# Patient Record
(demographics unavailable — no encounter records)

---

## 2025-04-23 NOTE — DATA REVIEWED
[FreeTextEntry1] : Independent review of imaging and independent interpretation was performed at today's visit. -4/24/23 CT Chest non contrast through [facility]

## 2025-04-23 NOTE — HISTORY OF PRESENT ILLNESS
[FreeTextEntry1] : Ms. STEPHANIE HELLER is a 61-year-old female who presents today for follow up. Previously, she has been followed for a right lung nodule. At our last visit in May 2022, it was recommended she obtain CT surveillance imaging in 6 months. However, she was lost to follow up.   Additional past medical history includes CVA, PFO, current smoker  4/28/22 CT Chest non contrast through [facility] -Emphysema and stable small nodules  4/24/23 CT Chest non contrast through [facility] -Again, noted is emphysema, bronchial thickening and scattered nodules measuring up to 4mm, without new or enlarging nodule

## 2025-04-23 NOTE — ASSESSMENT
[FreeTextEntry1] : Ms. STEPHANIE HELLER is a 61-year-old female who presents today for follow up. Previously, she has been followed for a right lung nodule. At our last visit in May 2022, it was recommended she obtain CT surveillance imaging in 6 months. However, she was lost to follow up.   Additional past medical history includes CVA, PFO, current smoker  4/24/23 CT Chest non contrast through [facility] -Again, noted is emphysema, bronchial thickening and scattered nodules measuring up to 4mm, without new or enlarging nodule  I have reviewed the patient's medical records and diagnostic images at time of this consultation and have made the following recommendations: 1. 2.  All questions answered, patient verbalizes understanding and agrees to follow up accordingly.

## 2025-04-24 NOTE — DATA REVIEWED
[FreeTextEntry1] : Independent review of imaging and independent interpretation was performed at today's visit. -4/24/23 CT Chest non contrast through [facility]

## 2025-04-24 NOTE — ASSESSMENT
[FreeTextEntry1] : Ms. STEPHANIE HELLER is a 61-year-old female who presents today for follow up. Previously, she has been followed for a right lung nodule. At our last visit in May 2022, it was recommended she obtain CT surveillance imaging in 6 months. However, she was lost to follow up.   Additional past medical history includes CVA, PFO, current smoker  4/24/23 CT Chest non contrast through [facility] -Again, noted is emphysema, bronchial thickening and scattered nodules measuring up to 4mm, without new or enlarging nodule  I have reviewed the patient's medical records and diagnostic images at time of this consultation and have made the following recommendations: 1. 2.  All questions answered, patient verbalizes understanding and agrees to follow up accordingly.

## 2025-04-24 NOTE — HISTORY OF PRESENT ILLNESS
[FreeTextEntry1] : Ms. STEPHANIE HELLER is a 61-year-old female who presents today for follow up. Previously, she has been followed for a right lung nodule. At our last visit in May 2022, it was recommended she obtain CT surveillance imaging in 6 months. However, she was lost to follow up.   Additional past medical history includes CVA, PFO, current smoker  4/28/22 CT Chest non contrast through [facility] -Emphysema and stable small nodules  4/24/23 CT Chest non contrast through [facility] -Again, noted is emphysema, bronchial thickening and scattered nodules measuring up to 4mm, without new or enlarging nodule

## 2025-05-15 NOTE — ASSESSMENT
[FreeTextEntry1] : Ms. STEPHANIE HELLER is a 61-year-old female who presents today for follow up. Previously, she has been followed for a right lung nodule. At our last visit in May 2022, it was recommended she obtain CT surveillance imaging in 6 months. However, she was lost to follow up.   Additional past medical history includes CVA, PFO s/p repair (2013) current smoker  4/24/23 CT Chest non contrast through [facility] -Again, noted is emphysema, bronchial thickening and scattered nodules measuring up to 4mm, without new or enlarging nodule  Upon review of the CT imaging, there are scattered nodules measuring up to 4mm, however this scan is dated 2023. I am recommending she obtain more recent CT imaging for further evaluation of these nodules. All questions answered, patient verbalizes understanding and agrees to follow up accordingly.  I have reviewed the patient's medical records and diagnostic images at time of this consultation and have made the following recommendations: 1. CT Chest non contrast now 2. Return to care in 1 month or sooner if needed  I, Dr. Edmond personally performed the evaluation and management (E/M) services for this new patient.  That E/M includes conducting the initial examination, assessing all conditions, and establishing the plan of care.  Today, my ACP, Agnieszka Mccormack NP was here to observe my evaluation and management services for this patient to be followed going forward.

## 2025-05-15 NOTE — PHYSICAL EXAM
[General Appearance - Alert] : alert [Sclera] : the sclera and conjunctiva were normal [Outer Ear] : the ears and nose were normal in appearance [Neck Appearance] : the appearance of the neck was normal [Respiration, Rhythm And Depth] : normal respiratory rhythm and effort [Auscultation Breath Sounds / Voice Sounds] : lungs were clear to auscultation bilaterally [Heart Rate And Rhythm] : heart rate was normal and rhythm regular [Heart Sounds] : normal S1 and S2 [Examination Of The Chest] : the chest was normal in appearance [Abnormal Walk] : normal gait [Skin Color & Pigmentation] : normal skin color and pigmentation [No Focal Deficits] : no focal deficits [Oriented To Time, Place, And Person] : oriented to person, place, and time [Impaired Insight] : insight and judgment were intact [Affect] : the affect was normal [Mood] : the mood was normal

## 2025-05-15 NOTE — HISTORY OF PRESENT ILLNESS
[FreeTextEntry1] : Ms. STEPHANIE HLELER is a 61-year-old female who presents today for follow up. Previously, she has been followed for a right lung nodule. At our last visit in May 2022, it was recommended she obtain CT surveillance imaging in 6 months. However, she was lost to follow up.   Additional past medical history includes CVA, PFO s/p repair (2013) current smoker  4/28/22 CT Chest non contrast through [facility] -Emphysema and stable small nodules  4/24/23 CT Chest non contrast through [facility] -Again, noted is emphysema, bronchial thickening and scattered nodules measuring up to 4mm, without new or enlarging nodule  Today, she feels overall well, offers no complaints. She is undergoing a stress test for cardiac clearance for hand surgery.  Patient denies chest pain, palpitations, shortness of breath, cough, fevers, chills, fatigue, unintentional weight loss or gain, and night sweats.

## 2025-05-15 NOTE — DATA REVIEWED
[FreeTextEntry1] : Independent review of imaging and independent interpretation was performed at today's visit. -4/24/23 CT Chest non contrast through [facility]

## 2025-05-15 NOTE — PHYSICAL EXAM
[General Appearance - Alert] : alert [Sclera] : the sclera and conjunctiva were normal [Outer Ear] : the ears and nose were normal in appearance [Neck Appearance] : the appearance of the neck was normal [Respiration, Rhythm And Depth] : normal respiratory rhythm and effort [Auscultation Breath Sounds / Voice Sounds] : lungs were clear to auscultation bilaterally Detail Level: Generalized [Heart Rate And Rhythm] : heart rate was normal and rhythm regular [Heart Sounds] : normal S1 and S2 [Examination Of The Chest] : the chest was normal in appearance [Abnormal Walk] : normal gait [Skin Color & Pigmentation] : normal skin color and pigmentation Detail Level: Zone [No Focal Deficits] : no focal deficits [Oriented To Time, Place, And Person] : oriented to person, place, and time [Impaired Insight] : insight and judgment were intact [Affect] : the affect was normal [Mood] : the mood was normal

## 2025-05-15 NOTE — DATA REVIEWED
[FreeTextEntry1] : Independent review of imaging and independent interpretation was performed at today's visit. -4/24/23 CT Chest non contrast through [facility]

## 2025-05-15 NOTE — REVIEW OF SYSTEMS
[Negative] : Heme/Lymph [Fever] : no fever [Chills] : no chills [Feeling Poorly] : not feeling poorly [Feeling Tired] : not feeling tired [Chest Pain] : no chest pain [Shortness Of Breath] : no shortness of breath [SOB on Exertion] : no shortness of breath during exertion

## 2025-05-15 NOTE — ASSESSMENT
[FreeTextEntry1] : Ms. STEPHANIE HELLER is a 61-year-old female who presents today for follow up. Previously, she has been followed for a right lung nodule. At our last visit in May 2022, it was recommended she obtain CT surveillance imaging in 6 months. However, she was lost to follow up.   Additional past medical history includes CVA, PFO s/p repair (2013) current smoker  4/24/23 CT Chest non contrast through [facility] -Again, noted is emphysema, bronchial thickening and scattered nodules measuring up to 4mm, without new or enlarging nodule  Upon review of the CT imaging, there are scattered nodules measuring up to 4mm, however this scan is dated 2023. I am recommending she obtain more recent CT imaging for further evaluation of these nodules. All questions answered, patient verbalizes understanding and agrees to follow up accordingly.  I have reviewed the patient's medical records and diagnostic images at time of this consultation and have made the following recommendations: 1. CT Chest non contrast now 2. Return to care in 1 month or sooner if needed  I, Dr. Edmond personally performed the evaluation and management (E/M) services for this new patient.  That E/M includes conducting the initial examination, assessing all conditions, and establishing the plan of care.  Today, my ACP, Agnieszka Mccormack NP was here to observe my evaluation and management services for this patient to be followed going forward.

## 2025-05-15 NOTE — HISTORY OF PRESENT ILLNESS
[FreeTextEntry1] : Ms. STEPHANIE HELLER is a 61-year-old female who presents today for follow up. Previously, she has been followed for a right lung nodule. At our last visit in May 2022, it was recommended she obtain CT surveillance imaging in 6 months. However, she was lost to follow up.   Additional past medical history includes CVA, PFO s/p repair (2013) current smoker  4/28/22 CT Chest non contrast through [facility] -Emphysema and stable small nodules  4/24/23 CT Chest non contrast through [facility] -Again, noted is emphysema, bronchial thickening and scattered nodules measuring up to 4mm, without new or enlarging nodule  Today, she feels overall well, offers no complaints. She is undergoing a stress test for cardiac clearance for hand surgery.  Patient denies chest pain, palpitations, shortness of breath, cough, fevers, chills, fatigue, unintentional weight loss or gain, and night sweats.

## 2025-07-02 NOTE — HISTORY OF PRESENT ILLNESS
[FreeTextEntry1] : Mrs. Harris is a 61-year-old female who presents today for a follow up appointment. She has been followed since approximately 2017 for lung nodules. At our last visit on 5/15/25, it was recommended she obtain updated CT Chest imaging to evaluate lung nodules as most recent imaging was from 2023. She returns today to review CT imaging results.   Her past medical history is significant for current smoker, emphysema, CVA, and patent PFO s/p repair in 2013.  The patient had previously been lost to follow-up. (Last seen in 2022)  4/28/22 CT Chest non contrast at [facility] -Emphysema and stable small lung nodules   4/24//2023 CT Chest non contrast at [facility] -Again noted is emphysema, bronchial thickening and scattered nodules measuring up to 4 mm, without new or enlarging nodules.   5/23/25 CT Chest non contrast at [facility] -Stable lung findings. No suspicious new lesion.

## 2025-07-02 NOTE — HISTORY OF PRESENT ILLNESS
[FreeTextEntry1] : Mrs. Harris is a 61-year-old female who presents today for a follow up appointment. She has been followed since approximately 2017 for lung nodules. At our last visit on 5/15/25, it was recommended she obtain updated CT Chest imaging to evaluate lung nodules as most recent imaging was from 2023. She returns today to review CT imaging results.   Her past medical history is significant for current smoker, emphysema, CVA, and patent PFO s/p repair in 2013.  The patient had previously been lost to follow-up. (Last seen in 2022)  4/28/22 CT Chest non contrast at [facility] -Emphysema and stable small lung nodules   4/24//2023 CT Chest non contrast at [facility] -Again noted is emphysema, bronchial thickening and scattered nodules measuring up to 4 mm, without new or enlarging nodules.   5/23/25 CT Chest non contrast at [facility] -Stable lung findings. No suspicious new lesion.

## 2025-07-02 NOTE — ASSESSMENT
[FreeTextEntry1] : Mrs. Harris is a 61-year-old female who presents today for a follow up appointment. She has been followed since approximately 2017 for lung nodules. At our last visit on 5/15/25, it was recommended she obtain updated CT Chest imaging to evaluate lung nodules as most recent imaging was from 2023. She returns today to review CT imaging results.   Her past medical history is significant for current smoker, emphysema, CVA, and patent PFO s/p repair in 2013.  5/23/25 CT Chest non contrast at [facility] -Stable lung findings. No suspicious new lesion.   I have reviewed the patient's medical records and diagnostic images at time of this consultation and have made the following recommendations: 1. 2.  All questions answered, patient verbalizes understanding and agrees to follow up accordingly.

## 2025-07-02 NOTE — ASSESSMENT
[FreeTextEntry1] : Mrs. Harris is a 61-year-old female who presents today for a follow up appointment. She has been followed since approximately 2017 for lung nodules. At our last visit on 5/15/25, it was recommended she obtain updated CT Chest imaging to evaluate lung nodules as most recent imaging was from 2023. She returns today to review CT imaging results.   Her past medical history is significant for current smoker, emphysema, CVA, and patent PFO s/p repair in 2013.  5/23/25 CT Chest non contrast at [facility] -Stable lung findings. No suspicious new lesion.   I have reviewed the patient's medical records and diagnostic images at time of this consultation and have made the following recommendations: 1. 2.  All questions answered, patient verbalizes understanding and agrees to follow up accordingly.

## 2025-07-02 NOTE — DATA REVIEWED
[FreeTextEntry1] : Independent review of imaging and independent interpretation was performed at today's visit. -5/23/25 CT Chest non contrast at [facility] 
[FreeTextEntry1] : Independent review of imaging and independent interpretation was performed at today's visit. -5/23/25 CT Chest non contrast at [facility] 
General Sunscreen Counseling: I recommended a broad spectrum sunscreen with a SPF of 30 or higher. I explained that SPF 30 sunscreens block approximately 97 percent of the sun's harmful rays. Sunscreens should be applied at least 15 minutes prior to expected sun exposure and then every 2 hours after that as long as sun exposure continues. If swimming or exercising sunscreen should be reapplied every 45 minutes to an hour after getting wet or sweating. One ounce, or the equivalent of a shot glass full of sunscreen, is adequate to protect the skin not covered by a bathing suit. I also recommended a lip balm with a sunscreen as well. Sun protective clothing can be used in lieu of sunscreen but must be worn the entire time you are exposed to the sun's rays.
Detail Level: Detailed

## 2025-07-03 NOTE — HISTORY OF PRESENT ILLNESS
[FreeTextEntry1] : Mrs. Harris is a 61-year-old female who presents today for a follow up appointment. She has been followed since approximately 2017 for lung nodules. At our last visit on 5/15/25, it was recommended she obtain updated CT Chest imaging to evaluate lung nodules as most recent imaging was from 2023. She returns today to review CT imaging results.   Her past medical history is significant for current smoker, emphysema, CVA, and patent PFO s/p repair in 2013.  The patient had previously been lost to follow-up. (Last seen in 2022)  4/28/22 CT Chest non contrast at [facility] -Emphysema and stable small lung nodules   4/24//2023 CT Chest non contrast at [facility] -Again noted is emphysema, bronchial thickening and scattered nodules measuring up to 4 mm, without new or enlarging nodules.   5/23/25 CT Chest non contrast at [facility] -Stable lung findings. No suspicious new lesion.

## 2025-07-03 NOTE — DATA REVIEWED
[FreeTextEntry1] : Independent review of imaging and independent interpretation was performed at today's visit. -5/23/25 CT Chest non contrast at [facility]

## 2025-07-03 NOTE — ASSESSMENT
[FreeTextEntry1] : Mrs. Harris is a 61-year-old female who presents today for a follow up appointment. She has been followed since approximately 2017 for lung nodules. At our last visit on 5/15/25, it was recommended she obtain updated CT Chest imaging to evaluate lung nodules as most recent imaging was from 2023. She returns today to review CT imaging results.   Her past medical history is significant for current smoker, emphysema, CVA, and patent PFO s/p repair in 2013.  5/23/25 CT Chest non contrast at [facility] -Stable lung findings. No suspicious new lesion.   I have reviewed the patient's medical records and diagnostic images at time of this consultation and have made the following recommendations: 1. 2.  All questions answered, patient verbalizes understanding and agrees to follow up accordingly.

## 2025-07-10 NOTE — ASSESSMENT
[FreeTextEntry1] : Mrs. Harris is a 61-year-old female who presents today for a follow up appointment. She has been followed since approximately 2017 for lung nodules. At our last visit on 5/15/25, it was recommended she obtain updated CT Chest imaging to evaluate lung nodules as most recent imaging was from 2023. She returns today to review CT imaging results.   Her past medical history is significant for current smoker, emphysema, CVA, and patent PFO s/p repair in 2013.  5/23/25 CT Chest non contrast at [facility] -Stable lung findings. No suspicious new lesion.   Upon review of the CT imaging dated 5/23/25, there are stable small nodules, no new suspicious or concerning findings. I would like to continue to monitor due to her being a current smoker, therefore I am recommending she obtain CT Chest non contrast in 1 year. All questions answered, patient verbalizes understanding and agrees to follow up accordingly.  I have reviewed the patient's medical records and diagnostic images at time of this consultation and have made the following recommendations: 1. CT Chest non contrast in 1 year 2. Return to care in 1 year or sooner if needed  I, Dr. Edmond personally performed the evaluation and management (E/M) services for this established patient who presents today with an existing condition(s). That E/M includes conducting the examination, assessing all new/exacerbated conditions, and establishing a new plan of care. Today, my ACP, Agnieszka Mccormack NP, was here to observe my evaluation and management services for this existing condition to be followed going forward.

## 2025-07-10 NOTE — PHYSICAL EXAM
[Sclera] : the sclera and conjunctiva were normal [Neck Appearance] : the appearance of the neck was normal [Respiration, Rhythm And Depth] : normal respiratory rhythm and effort [Auscultation Breath Sounds / Voice Sounds] : lungs were clear to auscultation bilaterally [Heart Rate And Rhythm] : heart rate was normal and rhythm regular [Heart Sounds] : normal S1 and S2 [Examination Of The Chest] : the chest was normal in appearance [Abnormal Walk] : normal gait [Skin Color & Pigmentation] : normal skin color and pigmentation [No Focal Deficits] : no focal deficits [Oriented To Time, Place, And Person] : oriented to person, place, and time [Impaired Insight] : insight and judgment were intact [Affect] : the affect was normal [Mood] : the mood was normal

## 2025-07-10 NOTE — DATA REVIEWED
[FreeTextEntry1] : Independent review of imaging and independent interpretation was performed at today's visit. -5/23/25 CT Chest non contrast at [facility]

## 2025-07-10 NOTE — HISTORY OF PRESENT ILLNESS
[FreeTextEntry1] : Mrs. Harris is a 61-year-old female who presents today for a follow up appointment. She has been followed since approximately 2017 for lung nodules. At our last visit on 5/15/25, it was recommended she obtain updated CT Chest imaging to evaluate lung nodules as most recent imaging was from 2023. She returns today to review CT imaging results.   Her past medical history is significant for current smoker, emphysema, CVA, and patent PFO s/p repair in 2013.  The patient had previously been lost to follow-up. (Last seen in 2022)  4/28/22 CT Chest non contrast at [facility] -Emphysema and stable small lung nodules   4/24//2023 CT Chest non contrast at [facility] -Again noted is emphysema, bronchial thickening and scattered nodules measuring up to 4 mm, without new or enlarging nodules.   5/23/25 CT Chest non contrast at [facility] -Stable lung findings. No suspicious new lesion.   Today, she feels overall well. She has been experiencing sneezing, dry cough due to allergies. She reports she has an upcoming thyroid biopsy next week. Patient denies chest pain, palpitations, shortness of breath, fevers, chills, fatigue, unintentional weight loss or gain, and night sweats.

## 2025-07-10 NOTE — REVIEW OF SYSTEMS
[Cough] : cough [Negative] : Heme/Lymph [Fever] : no fever [Chills] : no chills [Feeling Poorly] : not feeling poorly [Feeling Tired] : not feeling tired [Shortness Of Breath] : no shortness of breath [SOB on Exertion] : no shortness of breath during exertion

## 2025-07-10 NOTE — DATA REVIEWED
[FreeTextEntry1] : Independent review of imaging and independent interpretation was performed at today's visit. -5/23/25 CT Chest non contrast at [facility]

## 2025-07-10 NOTE — ASSESSMENT
[FreeTextEntry1] : Mrs. Harris is a 61-year-old female who presents today for a follow up appointment. She has been followed since approximately 2017 for lung nodules. At our last visit on 5/15/25, it was recommended she obtain updated CT Chest imaging to evaluate lung nodules as most recent imaging was from 2023. She returns today to review CT imaging results.   Her past medical history is significant for current smoker, emphysema, CVA, and patent PFO s/p repair in 2013.  5/23/25 CT Chest non contrast at [facility] -Stable lung findings. No suspicious new lesion.   Upon review of the CT imaging dated 5/23/25, there are stable small nodules, no new suspicious or concerning findings. I would like to continue to monitor due to her being a current smoker, therefore I am recommending she obtain CT Chest non contrast in 1 year. All questions answered, patient verbalizes understanding and agrees to follow up accordingly.  I have reviewed the patient's medical records and diagnostic images at time of this consultation and have made the following recommendations: 1. CT Chest non contrast in 1 year 2. Return to care in 1 year or sooner if needed  I, Dr. Edmond personally performed the evaluation and management (E/M) services for this established patient who presents today with an existing condition(s). That E/M includes conducting the examination, assessing all new/exacerbated conditions, and establishing a new plan of care. Today, my ACP, Agnieszka Mccormack NP, was here to observe my evaluation and management services for this existing condition to be followed going forward.

## 2025-07-10 NOTE — HISTORY OF PRESENT ILLNESS
[FreeTextEntry1] : Mrs. Harris is a 61-year-old female who presents today for a follow up appointment. She has been followed since approximately 2017 for lung nodules. At our last visit on 5/15/25, it was recommended she obtain updated CT Chest imaging to evaluate lung nodules as most recent imaging was from 2023. She returns today to review CT imaging results.   Her past medical history is significant for current smoker, emphysema, CVA, and patent PFO s/p repair in 2013.  The patient had previously been lost to follow-up. (Last seen in 2022)  4/28/22 CT Chest non contrast at [facility] -Emphysema and stable small lung nodules   4/24//2023 CT Chest non contrast at [facility] -Again noted is emphysema, bronchial thickening and scattered nodules measuring up to 4 mm, without new or enlarging nodules.   5/23/25 CT Chest non contrast at [facility] -Stable lung findings. No suspicious new lesion.   Today, she feels overall well. She has been experiencing sneezing, dry cough due to allergies. She reports she has an upcoming thyroid biopsy next week. Patient denies chest pain, palpitations, shortness of breath, fevers, chills, fatigue, unintentional weight loss or gain, and night sweats.